# Patient Record
Sex: MALE | Race: WHITE
[De-identification: names, ages, dates, MRNs, and addresses within clinical notes are randomized per-mention and may not be internally consistent; named-entity substitution may affect disease eponyms.]

---

## 2018-07-10 ENCOUNTER — HOSPITAL ENCOUNTER (OUTPATIENT)
Dept: HOSPITAL 68 - STS | Age: 58
End: 2018-07-10
Payer: COMMERCIAL

## 2018-07-10 DIAGNOSIS — I10: ICD-10-CM

## 2018-07-10 DIAGNOSIS — G47.33: ICD-10-CM

## 2018-07-10 DIAGNOSIS — H25.9: Primary | ICD-10-CM

## 2018-07-10 PROCEDURE — V2632 POST CHMBR INTRAOCULAR LENS: HCPCS

## 2018-07-10 NOTE — OPERATIVE REPORT
Operative/Inv Procedure Report
Surgery Date: 07/10/18
Name of Procedure:
Cataract extraction with intraocular lens implantation left eye
Pre-Operative Diagnosis:
Age-related cataract left eye
Post-Operative Diagnosis:
Same
Estimated Blood Loss: none
Surgeon/Assistant:
Albert ROOT,Moisés HERNANDEZ
 
Anesthesia: local monitored anesthesi
Complications:
None
 
Operative/Procedure Note
Note:
Preoperatively the patient was noted to have 20/60 vision in the left eye   . 
 
The risks, benefits, and alternatives to surgery were discussed at length with 
the patient.  Informed consent was obtained.  The patient was brought to the 
operating room where the left eye was prepped and draped in the normal sterile 
fashion. 
 
 A speculum was placed on the left eye with good exposure.  A stab incision was 
made using a paracentesis blade.  Intracameral lidocaine was placed.  
Viscoelastic was used to form the anterior chamber.  A clear corneal incision 
was made using keratome blade.  A continuous curvilinear capsulorrhexis was made
using a cystotome needle followed by Utrata forceps.  There was no extension of 
the rhexis.  Hydrodissection was performed using balanced salt solution.  The 
cataract was removed using a stop and chop technique.  Residual cortex was 
removed using coaxial irrigation and aspiration.  The capsule was polished using
irrigation and aspiration and the posterior capsule was cleaned using a balanced
salt solution jet.  There was no residual lens material inside the eye.  The 
capsular bag was reformed using viscoelastic.  
 
An intraocular lens MX60E of power 21.0 was verified and confirmed.  It was 
loaded into an injector and injected into the eye.  The lens was placed entirely
within the capsular bag.  Viscoelastic was evacuated using irrigation and 
aspiration.  The wounds were stromally hydrated and the eye filled to 
physiologic pressure using balanced salt solution.  Intracameral cefuroxime was 
placed.  Speculum was removed and a shield was placed on the eye.  The patient 
was brought to the recovery area without incident.  Instructions were given to 
follow-up the next day for routine postoperative care.